# Patient Record
(demographics unavailable — no encounter records)

---

## 2017-04-11 NOTE — HEMODYNAMI
PATIENT:MARIA LUISA RICK                                     MEDICAL RECORD: [id number]
: 37                                            LOCATION:D.CAT          
ACCT# [id number]                                       ADMISSION DATE: 17
 
 
 Generatedon:201710:50
Patient name: MARIA LUISA RICK Patient #: [id number] Visit #: [id number] SSN: [id number]
 :
1937 Date of study: 2017
Page: Of
Hemodynamic Procedure Report
****************************
Patient Data
Patient Demographics
Procedure consent was obtained
First Name: MARIA LUISA           Gender: Female
Last Name: MERLINE           : 1937
Patient #: [id number]       Age: 80 year(s)
Visit #: [id number]       Race: 
SSN: 
Accession #:
[id number]
Additional ID: [id number]
Contact details
Address: [facility]              Phone: [phone]
[facility]
State: [place]
City: [place]
Zip code: [address]
Admission
Admission Data
Admission Date: 2017   Admission Time: 7:23
Arrival Date: 2017     Arrival Time: 10:00
Admit Source: Other         Insurance Payor: Medicare
Height (in.): 6             BSA: 0.3 (m2)
Height (cm.): 15.24         BMI: 2714.63 (kg/m2)
Weight (lbs.): 139
Weight (kg.): 63.05
Lab Results
Lab Result Date: 2017  Lab Result Time: 0:00
Biochemistry
Name         Units    Result                Min      Max
BUN          mg/dl    35       --(----)-*   7        18
Creatinine   mg/dl    1.5      --(----)-*   0.6      1.3
CBC
Name         Units    Result                Min      Max
Hemoglobin   g/dl     13.1     -*(----)--   13.5     17.5
Procedure
Procedure Types
Cath Procedure
Diagnostic Procedure
[facility] w/Coronaries w/Grafts
FFR/IVUS
Intra-Coronary IVUS Initial
Miscellaneous Procedures
Moderate Sedation up to 45 minutes
Procedure Description
Procedure Date
 
Procedure Date: 2017
Procedure Start Time: 10:26
Procedure End Time: 10:46
Procedure Staff
Name                            Function
Joselito Carlisle MD                Performing Physician
Delilah Lind RT               Scrub
Alexa Dan RN                  Nurse
Diamond Morales RT                    Monitor
Indication
Cardiomyopathy
Procedure Data
Cath Procedure
Fluoroscopy
Diagnostic fluoroscopy      Total fluoroscopy Time: 5.7
time: 5.7 min               min
Diagnostic fluoroscopy      Total fluoroscopy dose: 784
dose: 784 mGy               mGy
Contrast Material
Contrast Material Type                       Amount (ml)
Isovue 370                                   112
Entry Location
Entry     Primary  Successful  Side  Size  Upsize Upsize Entry    Closure Succes
sful  Closure
Location                             (Fr)  1 (Fr) 2 (Fr) Remarks  Device        
      Remarks
Femoral                        Right 5 Fr  6 Fr                   Exoseal
artery                                     Short
Estimated blood loss: 5 ml
Diagnostic catheters
Device Type               Used For           End Catheter
Placement
Cordis 5Fr Pigtail        LV Angiography
Catheter (MP)
Cordis 5Fr JL 4.0         Left Coronary
Catheter (MP)             Angiography
Cordis 5Fr 3DRC Catheter  Right Coronary
(MP)                      Angiography
Diagnostic Infinity 5Fr   Multi-vessel
AR 2 MOD catheter         Angiography
Procedure Complications
No complications
Procedure Medications
Medication           Administration Route Dosage
Oxygen               NC                   2 l/min
Lidocaine 2%         added to field       20
Heparin Flush Bag    added to field       2 bags
(1000units/500ml NS)
0.9% NaCl            I.V.                 100 ml/hr
Versed               I.V.                 1 mg
Fentanyl             I.V.                 50 mcg
Versed               I.V.                 1 mg
Fentanyl             I.V.                 50 mcg
Heparin Bolus        I.V.                 4000 units
Integrilin (Bolus    I.V.                 5.6 ml
2mg/ml)
Plavix               P.O.                 600 mg
Hemodynamics
Rest
BSA: 0.3 (m2) HGB: 13.1 (g/dl) O2 Consumption: Estimated: 24.49 (ml/min) O2 Cons
 
umption indexed:
Estimated:81.63 (ml/min/m) Heart Rate: 48 (bpm)
Pressure Samples
Time     Site     Value (mmHg) Purpose      Heart      Use
Rate(bpm)
10:29    LV       74/9,11      Snapshot     70
Snapshots
Pre Cath      Intra         NCS           Post Cath
Vital Signs
Time     Heart  Resp   SPO2 NIBP (mmHg) Rhythm  Pain    Sedation
Rate   (ipm)  (%)                      Status  Level
(bpm)
9:46:48  73     16     97   182/80(134) NSR     0 (11)  10(A)
, No
pain
9:51:21  69     16     100  174/77(132) NSR     0 (11)  10(A)
, No
pain
9:55:49  69     18     100  167/72(131) NSR     0 (11)  10(A)
, No
pain
10:00:19 69     18     100  169/72(125) NSR     0 (11)  10(A)
, No
pain
10:04:41 69     19     96   157/75(121) NSR     0 (11)  10(A)
, No
pain
10:09:10 69     20     98   155/62(124) NSR     0 (11)  10(A)
, No
pain
10:13:36 69     18     99   158/64(113) NSR     0 (11)  10(A)
, No
pain
10:17:56 69     16     97   146/64(114) NSR     0 (11)  9(A)
, No
pain
10:22:19 69     17     94   130/60(107) NSR     0 (11)  9(A)
, No
pain
10:27:26 84     16     97   132/55(94)  NSR     0 (11)  9(A)
, No
pain
10:31:48 69     16     95   132/59(95)  NSR     0 (11)  9(A)
, No
pain
10:36:06 69     16     95   123/52(96)  NSR     0 (11)  9(A)
, No
pain
10:40:18 69     17     94   110/45(82)  NSR     0 (11)  9(A)
, No
pain
10:44:38 69     16     98   109/44(77)  NSR     0 (11)  9(A)
, No
pain
10:48:56 69     16     94   107/44(83)  NSR     0 (11)  10(A)
, No
pain
Medications
Time     Medication       Route  Dose  Verified Delivered Reason          Notes 
   Effectiveness
 
by       by
9:48:32  Oxygen           NC     2     Joselito Liu    used for
l/min Orestes Dan RN   procedure
9:48:38  Lidocaine 2%     added  20ml  Joselito Yee   for local
to     vial  Orestes Carlisle MD  anesthetic
field
9:48:45  Heparin Flush    added  2     Joselito Yee   used for
Bag              to     bags  Orestes Carlisle MD  procedure
(1000units/500ml field
NS)
9:48:55  0.9% NaCl        I.V.   100   Joselitomariel Walkerie    Per physician
ml/hr Orestes Dan RN
10:11:39 Versed           I.V.   1 mg  Joselito Liu    for sedation
Orestes Dan RN
10:11:45 Fentanyl         I.V.   50    Joselito  Buffie    for sedation
mcg   Orestes Dan RN
10:27:54 Versed           I.V.   1 mg  Joselito Liu    for sedation
Orestes Dan RN
10:27:59 Fentanyl         I.V.   50    Joselito Liu    for sedation
mcg   Orestes Dan RN
10:35:18 Heparin Bolus    I.V.   4000  Joselito Liu    for             verifi
ed
units Orestes Dan RN   anticoagulation with dr carlisle
10:43:32 Integrilin       I.V.   5.6   Joselito Liu    for             Wasted
(Bolus 2mg/ml)          ml    Orestes Dan RN   antiplatelet    4.4 ml
therapy         of vial
10:49:09 Plavix           P.O.   600   Joselito Liu    for
mg    Orestes Dan RN   antiplatelet
therapy
Procedure Log
Time     Note
9:32:26  Patient Height : 15.24 cm
9:32:31  Patient Weight : 63.05 kg
9:32:32  Admit Source: Other
9:32:36  Arrival Date: 2017 10:00:00 AM
9:32:42  Insurance Payor : Medicare
9:33:36  Diagnostic Cath Status : Elective
9:35:47  Lab Result : Creatinine 1.5 mg/dl
9:35:47  Lab Result : BUN 35 mg/dl
9:35:47  Lab Result : Hemoglobin 13.1 g/dl
9:35:54  Diamond Morales RT(R) sent for patient. Start room use.
9:35:56  Time tracking: Regular hours
9:36:01  Plan of Care:Hemodynamics will remain stable., Cardiac
rhythm will remain stable., Comfort level will be
maintained., Respiratory function will remain
adequate., Patient/ family verbilizes understanding of
procedure., Procedure tolerated without complication.,
Recovers from procedure without complications..
9:36:08  Use device set Femoral Dx
9:36:11  Acist Syringe opened to sterile field.
9:36:11  Bag Decanter opened to sterile field.
9:36:11  Medline Cath Pack opened to sterile field.
9:36:12  Terumo 5Fr [place] Sheath opened to sterile field.
9:36:12  St Triston 260cm J .035 wire opened to sterile field.
9:36:14  Acist Hand Control opened to sterile field.
9:36:14  Acist Manifold opened to sterile field.
9:36:15  Diagnostic Infinity 5Fr Multipack catheter opened to
sterile field.
9:36:16  Tegaderm 4 x 4 opened to sterile field.
 
9:39:52  Patient received from Pre/Post Procedure Room to CCL 2
Alert and oriented. Tansferred to table in Supine
position.
9:39:54  Warm blankets applied, and rajeev hugger turned on for
patient comfort.
9:39:54  Correct patient and procedure confirmed by team.
9:39:56  Signed procedure consent form obtained from patient.
9:39:56  ECG and BP/O2 sat monitors applied to patient.
9:39:58  Full Disclosure recording started
9:43:44  H&P Date Dictated: 2017 Within 30 days and on
chart., H&P Addendum completed by physician on day of
procedure. (MUST COMPLETE FOR ALL OUTPATIENTS).
9:43:47  Pre-procedure instructions explained to patient.
9:43:48  Family in waiting room.
9:43:55  Is the patient allergic to Iodine/contrast media? No.
9:44:02  Is patient on blood thinner?No
9:44:07  Patient diabetic? Yes.
9:44:08  If diabetic: On Metformin? No
9:44:14  Snore? Yes
9:44:17  Sleep apnea? No
9:44:19  Sticks out tongue? Yes
9:44:25  Dentures? Yes in tight
9:44:30  Previous problem with sedation/anesthesia? No ?
9:44:40  Patient pain scale 0/10 ?.
9:44:51  IV patent on arrival in left forearm with 0.9% NaCl at
KVO.
9:44:59  Lab results completed and on chart.
9:45:23  Vital chart was started
9:45:54  Baseline sample Acquired.
9:46:04  Right groin area was prepped with chlora-prep and
draped in sterile fashion
9:46:08  Alarms reviewed by R. N.
9:46:09  Sharps counted by scrub and verified by R.N.
9:46:09  Physician paged
9:46:20  Baseline sample Acquired.
9:48:22  Baseline sample Acquired.
9:48:27  Rhythm: sinus rhythm
9:48:32  Oxygen 2 l/min NC was administered by Alexa Dan RN;
used for procedure;
9:48:34  Deviated septum? No
9:48:35  Opens mouth fully? Yes
9:48:37  Airway obstruction? No ?
9:48:38  Lidocaine 2% 20ml vial added to field was administered
by Joselito Carlisle MD; for local anesthetic;
9:48:41  Pre procedure: right dorsailis pedis pulse 1+
Palpable, but thready & weak; easily obliterated
9:48:45  Heparin Flush Bag (1000units/500ml NS) 2 bags added to
field was administered by Joselito Carlisle MD; used for
procedure;
9:48:55  0.9% NaCl 100 ml/hr I.V. was administered by Alexa Dan RN; Per physician;
9:55:53  Indication : Cardiomyopathy
10::46 Physician arrived
10::47 --------ALL STOP TIME OUT------
10::48 Final Timeout: patient, procedure, and site verified
with staff and physician. All members of the team are
in agreement.
10::50 Right groin site verified by team.
10::54 Physical assessment completed. ASA score P 2 - A
patient with mild systemic disease as per Joselito Carlisle MD.
10::58 Sedation plan: IV Moderate Sedation Versed, Fentanyl
10::39 Versed 1 mg I.V. was administered by Alexa Dan RN;
for sedation;
10::45 Fentanyl 50 mcg I.V. was administered by Alexa Dan
RN; for sedation;
10::26 Procedure started.
10::29 Local anesthetic to right femoral artery with
Lidocaine 2% by Joselito Carlisle MD.**INITIAL ACCESS
ONLY**
10:26:40 A 5 Fr sheath was inserted into the Right Femoral
artery
10::54 Versed 1 mg I.V. was administered by Alexa Dan RN;
for sedation;
10::59 Fentanyl 50 mcg I.V. was administered by Alexa Dan
RN; for sedation;
10:28:02 A Cordis 5Fr Pigtail Catheter (MP) was advanced over
the wire and used for LV Angiography.
10::06 Zero performed for pressure channel P1
10::36 LV hemodynamics recorded.
10::37 LV gram done using BRANDT
10::40 Injector settings: Ml/sec: 5, Volume: 15,
10::46 EF : 55 %
10::50 Catheter removed.
10::57 A Cordis 5Fr JL 4.0 Catheter (MP) was advanced over
the wire and used for Left Coronary Angiography.
10:30:40 LCA angiography performed.
10:30:44 Injector settings: Ml/sec: 3, Volume: 6,
10:31:19 Catheter removed.
10:31:26 A Cordis 5Fr 3DRC Catheter (MP) was advanced over the
wire and used for Right Coronary Angiography.
10:31:54 Volcano Platinum [place] IVUS Catheter opened to
sterile field.
10:31:54 [place] Whisper J 300cm 0.014 guide wire opened to
sterile field.
10:31:55 Merit BasixCompak Inflation Kit opened to sterile
field.
10:31:56 Terumo 6Fr [place] Sheath opened to sterile field.
10:32:15 RCA angiography performed.
10:32:18 Injector settings: Ml/sec: 3, Volume: 6,
10:32:35 LIMA to LAD angiography performed.
10:32:49 Catheter removed.
10:32:57 A Diagnostic Infinity 5Fr AR 2 MOD catheter was
advanced over the wire and used for Multi-vessel
Angiography.
10:33:17 SVG to Circ angiography performed.
10:33:38 SVG to RCA angiography performed.
10:33:54 Catheter removed.
10:33:56 Cordis 6FR XBLAD 3.5 guide catheter opened to sterile
field.
10:34:01 Proceeding to intervention.
10:34:08 Sheath upsized to a 6 Fr Short.
10:34:54 6 Fr xblad 3.5 guide catheter was inserted over the
wire
10:35:18 Heparin Bolus 4000 units I.V. was administered by
Alexa Dan RN; for anticoagulation; verified with dr carlisle
10:35:23 whisper wire advanced.
10:38:06 Wire removed.
10:38:19 Handpay Choice PT Extra Support J 300cm .014 gu
 
opened to sterile field.
10:38:57 choice pt wire advanced.
10:39:00 Wire advanced across lesion.
10:39:28 IVUS catheter advanced over wire.
10:42:27 IVUS pass to LAD lesion performed.
10:42:28 IVUS catheter removed over wire.
10:43:32 Integrilin (Bolus 2mg/ml) 5.6 ml I.V. was administered
by Alexa Dan RN; for antiplatelet therapy; Wasted
4.4 ml of vial
10:43:38 Inflation Number: 1 A Medtronic Integrity 3.0 X 22
stent was prepped and advanced across the Prox LAD.
The stent was deployed at 13 MOODY for 0:10 (min:sec).
10:43:56 Stent catheter was removed intact over wire.
10:43:57 Wire removed.
10:43:57 Guide catheter removed.
10:44:09 Cordis 6Fr Exoseal opened to sterile field.
10:44:18 Sheath removed intact; hemostasis achieved with
Exoseal to the Right Femoral artery.
10:44:20 Procedure ended.(Physican Out)
10:45:24 Fluoroscopy time 05.70 minutes.
10:45:28 Fluoroscopy dose: 784 mGy
10:45:28 Flurop Dose total: 784
10:45:32 Contrast amount:Isovue 370 112ml.
10:45:33 Sharps counted by scrub and verified by R.N.
10:45:36 Insertion/operative site no bleeding no hematoma.
10:45:38 Post-op/insertion site Right Femoral artery dressed
using a 4 x 4 and Tegaderm.
10:45:41 Post right femoral artery:stable
10:45:42 Post Procedure Pulses reassessed and unchanged
10:45:44 Post procedure rhythm: unchanged.
10:45:47 Estimated blood loss: 5 ml
10:45:49 Post procedure instruction explained to
patient.Patient verbalizes understanding.
10:45:49 Patient needs reinforcement of post procedure
teaching.
10:46:27 Procedure type changed to Cath procedure, Diagnostic
procedure, LHC, LHC w/Coronaries w/Grafts, FFR/IVUS,
Intra-Coronary IVUS Initial, Miscellaneous Procedures,
Moderate Sedation up to 45 minutes
10:46:30 Procedure and supply charges have been captured,
reviewed, submitted and are correct.
10:46:34 Procedure Complication : No complications
10:46:36 Vital chart was stopped
10:46:36 See physician's report for complete and final results.
10:46:39 Report given to Pre/Post Procedure Room.
10:46:42 Patient transfered to Pre/Post Procedure Room with
Stretcher.
10:46:44 Procedure ended.
10:46:44 Full Disclosure recording stopped
10:46:52 **ACC-PCI Only** Patient was given prescriptions, or
instructed by Joselito Carlisle MD to start/continue the
following medications upon discharge: Plavix
10:46:53 End room use (Document Last)
10:49:09 Plavix 600 mg P.O. was administered by Alexa Dan RN;
for antiplatelet therapy;
Intervention Summary
Intervention Notes
Time     ActionType  Lesion and  Equipment Action#  Pressure  Duration
Attributes  Used
10:43:38 Place stent Prox LAD    Medtronic 1        13        00:10
 
Integrity
3.0 X 22
stent
Device Usage
Item Name   Manufacture  Quantity  Catalog Number Hospital Part    Current Minim
al Lot# /
Charge   Number  Stock   Stock   Serial#
Code
Acist       Acist        1         [id number]          751946   839860  489980  20
Syringe     Medical
Systems Inc
Bag         Microtek     1         2002S          871685   51355   299143  5
Decanter    Medical Inc.
Medline     Cardinal     1         [id number]      160956   64781   994825  5
Cath Pack   Health
Terumo 5Fr  Terumo       1         [id number]         519593   728710  347152  40
Longview
Sheath
St Triston     St Triston      1         216867         286337   253492  257819  30
260cm J
.035 wire
Acist Hand  Acist        1         63626          859247   350203  408087  5
Control     Medical
Systems Inc
Acist       Acist        1         98318          179524   471709  986082  5
Manifold    Medical
Systems Inc
Diagnostic  Cardinal     1         [id number]         861258   18415   739059  30
Infinity    Health
5Fr
Multipack
catheter
Tegaderm 4  3M           1         1626W          966050   021701  861911  5
x 4
Cordis 5Fr  Cardinal     1                                         404751  5
Pigtail     Health
Catheter
(MP)
Cordis 5Fr  Cardinal     1                                         122971  5
JL 4.0      Health
Catheter
(MP)
Cordis 5Fr  Cardinal     1                                         026204  5
3DRC        Health
Catheter
(MP)
[place]     [place]      1         07572B         635008   349493  957379  8
[place]
Eagleye
IVUS
Catheter
Woods      Woods       1         [id number]      061158   486559  559408  5
Whisper J   Vascular
300cm 0.014
guide wire
Merit       Merit        1         [id number]         671547   643771  841014  15
[employer] Medical
Inflation
Kit
Terumo 6Fr  Terumo       1         [id number]         693747   439854  564495  40
 
Longview
Sheath
Diagnostic  Cardinal     1         [id number]        043087   006586  627294  20
Infinity    Health
5Fr AR 2
MOD
catheter
Cordis 6FR  Cardinal     1         [id number]       811943   285221  518574  10
XBLAD 3.5   Health
guide
catheter
Zapata Sci  Zapata       1         [id number]  102625   526780  753964  5
Choice PT   Scientific
Extra
Support J
300cm .014
gu
Medtronic   Medtronic    1         [id number]      463853   258407  856227  1    
   [id number]
Integrity
3.0 X 22
stent
Cordis 6Fr  Cardinal     1                   920457   868999  548194  10
[facility]     Health
Signature Audit Roslyn
Stage           Time        Signature      Unsigned
Intra-Procedure 2017   Diamond Morales
10:50:42 AM RT(R)
Signatures
Monitor : Diamond Morales RT   Signature :
______________________________
Date : ______________ Time :
______________
 
 
 
 
 
 
 
 
 
 
 
 
 
 
 
 
 
 
 
 
 
 
[address] [address]

## 2017-04-11 NOTE — OP
PATIENT NAME:  MARIA LUISA RICK                              MEDICAL RECORD: [id number]
:37                                             LOCATION:D.CAT          
                                                         ADMISSION DATE:        
SURGEON:  LANG SHEPARD MD             
 
 
DATE OF OPERATION:  2017
 
PROCEDURES:
1.  PTCA stent LAD.
2.  Intravascular ultrasound of the LAD.
3.  Left heart catheterization.
4.  Selective coronary angiography.
5.  Vein graft angiography.
6.  Left ventriculogram.
 
INDICATION:  Angina and coronary artery disease.
 
PROCEDURE:  After informed consent was obtained and after detailed explanation
of risks, benefits as well as alternative therapies, the patient elected to
proceed with angiogram and angioplasty.  The right femoral area was prepped and
draped in normal sterile fashion.  Right femoral artery was cannulated via
modified Seldinger technique with placement of 6-Kinyarwanda sheath.  All catheters
exchanged through this sheath.
 
FINDINGS:  The left ventriculogram was performed in standard 30-degree BRANDT view,
reveals good cardiac wall motion, ejection fraction 50% to 55%.
 
SELECTIVE CORONARY ANGIOGRAPHY:
1.  Left main showed no significant angiographic disease.
2.  Left anterior descending has a 75% stenosis confirmed by intravascular
ultrasound proximally, this is not grafted.
3.  Left circumflex has 100% stenosis in the mid vessel.
4.  Vein graft to the circumflex is widely patent.
5.  Right coronary has 100% stenosis in the mid vessel.
6.  Vein graft to the right coronary is widely patent.
 
PTCA STENT OF THE LAD:  The stent used is a 3.0 x 22 mm Integrity.  Result was
0% residual stenosis.
 
OVERALL IMPRESSION:  Successful percutaneous transluminal coronary angioplasty
stent of the left anterior descending going from 75% initial stenosis confirmed
by intravascular ultrasound to 0% residual stenosis.
 
TRANSINT:[id number] Voice Confirmation ID: [id number] DOCUMENT ID: [id number]
                                           
                                           LANG SHEPARD MD             
 
 
 
CC:                                                             [id number]
DICTATION DATE: 17 1050     :     17 1745      [facility] CLI 
                                                                      17
[place] [phone]

## 2017-04-11 NOTE — NUR
VOIDED 150CC OF CLEAR YELLOW URINE. HOB ELEVATED 30 DEGREES. RIGHT
GROIN CDI, NO BLEEDING OR HEMATOMA NOTED.

## 2017-04-11 NOTE — NUR
2L NC, NO RESP DISTRESS. VSS. NO C/O CHEST PAIN OR NAUSEA. RIGHT
GROIN 6F EXOSEAL CDI, NO BLEEDING OR HEMATOMA NOTED.  AT
BEDSIDE, CALL LIGHT WITHIN REACH.

## 2017-04-11 NOTE — NUR
PATIENT UP TO GET DRESSED WITH ASSIST. VERBAL AND WRITTEN DISCHARGE
GONE OVER WITH PATIENT AND FAMILY. LEFT VIA WC TO PARKING FOR FAMILY
TO TRANSPORT HOME CHEST PAIN DENIED R/GROIN CDI

## 2017-04-11 NOTE — NUR
QUIETLY RESTING. VSS. 2L NC, NO RESP DISTRESS NOTED. RIGHT GROIN CDI,
NO BLEEDING OR HEMATOMA. NO C/O CHEST PAIN OR NAUSEA.

## 2017-04-11 NOTE — NUR
WATER AND SANDWICH TRAY GIVEN. NO C/O NAUSEA. RIGHT GROIN CDI, NO
BLEEDING OR HEMATOMA NOTED. CALL LIGHT WITHIN REACH.

## 2017-04-11 NOTE — NUR
2L NC, NO RESP DISTRESS NOTED. RIGHT GROIN CDI, NO BLEEDING OR
HEMATOMA NOTED. VSS. NO C/O CHEST PAIN OR NAUSEA. WILL CONTINUE TO
MONITOR.

## 2017-04-11 NOTE — NUR
RESTING IN BED WITH EYES CLOSED. 2L NC, NO RESP DISTRESS NOTED. VSS.
RIGHT GROIN 6F EXOSEAL CDI, NO BLEEDING OR HEMATOMA NOTED. NO C/O
CHEST PAIN OR NAUSEA. INSTRUCTED PT TO KEEP HEAD FLAT ON PILLOW AND
RIGHT LEG STRAIGHT.

## 2017-04-11 NOTE — NUR
RIGHT GROIN 6F EXO SEAL CDI, NO BLEEDING OR HEMATOMA NOTED. 2L NC,
NO RESP DISTRESS NOTED. NO C/O CHEST PAIN OR NAUSEA. WILL CONTINUE TO
MONITOR.

## 2017-06-06 NOTE — NUR
0955 TELEMETRY APPLIED TO PATIENT.  JESSICA DINH [facility]
TELEMETRY SHOWS SINUS RHYTHM, RATE 69.  A. HUONG RICKETTS

## 2017-06-10 NOTE — OP
PATIENT NAME:  MARIA LUISA RICK                              MEDICAL RECORD: [id number]
:37                                             LOCATION:D.OPS          
                                                         ADMISSION DATE:        
SURGEON:  AISLINN SAEED MD             
 
 
DATE OF OPERATION:  2017
 
SURGEON:  Aislinn Saeed MD
 
ANESTHESIA:  General, Dr. Shepherd.
 
OPERATION PERFORMED:  AICD pulse generator exchange.
 
PREOPERATIVE DIAGNOSIS:  Cardiomyopathy.
 
POSTOPERATIVE DIAGNOSIS:  Cardiomyopathy.
 
INDICATION FOR OPERATION:  Pulse generator end of life.
 
FINDINGS OF THE OPERATION:  The explanted generator Medtronic model number
[id number], serial number [id number].
 
Explanted lead Medtronic model number [id number], serial number [id number].
 
ESTIMATED BLOOD LOSS:  Less than 5 cc.
 
DESCRIPTION OF PROCEDURE:  After informed consent, adequate preoperative
medication evaluation, the patient was brought to the operating room, placed on
the table in the supine position.  After induction of general anesthesia and
application of appropriate monitoring devices, the left chest prepped and draped
in a sterile field, utilizing Betadine scrub, alcohol, and Betadine solution.  A
Betadine-impregnated drape was also used, 1% lidocaine was infiltrated over the
device as well as in the pocket incision made and dissection carried down the
pocket.  The pocket was opened and expanded.  The device was removed.  The
pocket was enlarged medially and inferiorly to accept the new device. 
Hemostasis was assured.  The leads were then exchanged to the new device and the
explanted device removed from the field.  The device underwent analysis with the
right atrial lead, P-wave 2.8, impedance 532 ohms, threshold 0.7 volts. 
Ventricular lead, R-wave 18.2, resistance 684 ohms, HVB impedance 46, HZX
impedance 62, pacing threshold 0.5 volts.  Left ventricular lead impedance 1142
ohms, pacing threshold 1.5.
 
Hemostasis was assured.  The wound was irrigated with copious amounts of
antibiotic solution and normal saline.  The device and leads were placed back in
the pocket and the instrument count and sponge count were correct times 2. 
Pocket was closed in layers utilizing 3-0 Vicryl on deep subcutaneous tissue,
5-0 subcuticular Monocryl on the skin, sterile dressings were applied.  The
patient tolerated the procedure well and was transferred to postanesthesia
recovery in satisfactory condition with the device dectection and therapy turned
on.
 
TRANSINT:[id number] Voice Confirmation ID: [id number] DOCUMENT ID: [id number]
 
 
 
OPERATIVE REPORT                               [id number]    MARIA LUISA RICK EDWARD MD             
 
 
 
Electronically Signed by AISLINN SAEED on 06/10/17 at 1332
 
 
 
 
 
 
 
 
 
 
 
 
 
 
 
 
 
 
 
 
 
 
 
 
 
 
 
 
 
 
 
 
 
 
 
 
 
 
 
 
 
CC:                                                             [id number]
DICTATION DATE: 17 1257     :     17 2234      [facility] 
                                                                      17
[place] [id number]

## 2017-06-10 NOTE — HP
PATIENT: MARIA LUISA RICK                                    MEDICAL RECORD: [id number]
ACCOUNT: [id number]                                    LOCATION:D.OPS         
: 37                                            ADMISSION DATE: 17
                                                         
 
                             HISTORY AND PHYSICAL EXAMINATION
 
 
MARIA LUISA Carrillo (81yo, F) ID# [id number]. Date/Time2017
01:[id number] McKinley [facility] Services Dept.NP_[facility]
ClinicProviderAISLINN HUTTON MDInsuranceMed Primary: [employer] (MEDICARE
REPLACEMENT/ADVANTAGE - HMO)
Insurance # : [id number]
Referring Provider Name : CHAPIN BOWER
Employer Name : RETIRED
Prescription: CMX - Member is eligible. 
Prescription: [facility] MEDICAID ADMINISTRATION - Member is eligible. Chief Complaint
pulse generator end-of-life
Patient's Care Team
Referring Provider ():  CHAPIN BOWER: [address] [place] [address], Ph [phone], Fax [phone] 
Referring Provider:  LANG SHEPARD MD: [address] [place] [address], Ph [phone], Fax [phone] NPI: [id number]
Patient's Pharmacies
[id number] ([place]): [address] [facility] [address], Ph [phone], Fax
[phone]
Vitals
BP:140/80 sitting R arm 2017 01:09 pm
130/70 sitting L arm 2017 01:09 pmHR:88irreg 2017 01:10 pmHt:5 ft
2017 01:09 pmWt:139 lbs 2017 01:06 pmBMI:27.1 2017 01:09
pmAllergies
Reviewed Allergies
LISINOPRILMETRONIDAZOLEPENICILLINSSULFA (SULFONAMIDE ANTIBIOTICS)Medications
Reviewed Medications
Accu-Chek Softclix Lancing Device+Lancets kit17   filledCaremarkAlcohol Prep
Pads17   filledCaremarkatorvastatin 80 mg frcuwo01/02/17  
filledCaremarkciprofloxacin 250 mg iwzkhz10/15/17   filledCaremarkclopidogrel 75 mg
iuaxnu45/03/17   filledCaremarkComfort EZ Syringe 0.5 mL 31 gauge x 5/16"17  
filledCaremarkDebrox 6.5 % ear drops
PLACE 5 DROPS IN BOTH EARS BID FOR 5 DAYS10/19/16   filledsurescriptsEmbrace Lancets
30 gauge17   filledCaremarkisosorbide mononitrate ER 30 mg tablet,extended
release 24 hr17   filledCaremarkLantus 100 unit/mL subcutaneous
ywoygxqo04/03/17   filledCaremarklisinopril 20 mg-hydrochlorothiazide 12.5 mg
xyprfu80/13/17   filledCaremarkmetoprolol succinate ER 50 mg tablet,extended release
24 hr17   filledCaremarknitrofurantoin monohydrate/macrocrystals 100 mg
vwvdvym37/16/17   filledCaremarkNovoLOG 100 unit/mL subcutaneous clnnadww90/03/17  
filledCaremarkOneTouch Verio tatcwv83/24/17   filledCaremarkTRUEplus Insulin 0.3 mL
31 gauge x 5/16" ubxzcyc34/03/17   filledCaremarkProblems
Reviewed Problems
 
Sick sinus syndrome - Onset: 2017
Automatic implantable cardiac defibrillator in situ - Onset: 2017
Family History
Discussed Family History
 
Father- Heart diseaseSocial History
Discussed Social History
Cardiology
 
 
 
HISTORY AND PHYSICAL                           [id number]    MARIA LUISA RICK            
 
 
Family history of heart disease?: Y
Smoking Status: Never smoker
High Cholesterol: Y
High blood pressure: Y
Diabetes: Y
Alcohol intake: None
Surgical History
Reviewed Surgical History
 
Other - defibrillator medtronic
Other - PTCA/stent
CABG
GYN History
(not configured)
Past Medical History
Discussed Past Medical History
Cancer: Y
Coronary Artery Disease: Y
Heart Disease: Y
High Blood Pressure: Y
Hyperlipidemia: Y
Irregular heart beat: Y
Shortness of Breath: Y
Notes: Cardiomyopathy
Documents for Discussion
N/A
Screening
None recorded.
HPI
Dysrhythmia
Reported by patient.
Symptoms: AICD generator exchange 
Frequency: seldom 
Limitations: mild
automatic implantable cardio defibrillator at end of life
ROS
Patient reports exercise intolerance  but reports no fever, no night sweats, no
significant weight gain, and no significant weight loss. She reports shortness of
breath when walking  but reports no chest pain, no arm pain on exertion, no
shortness of breath when lying down, no palpitations, and no known heart murmur. She
reports shortness of breath but reports no cough, no wheezing, and no coughing up
blood. She reports muscle aches, arthralgias/joint pain, and swelling in the
extremities but reports no muscle weakness and no back pain. Sh e reports no dry
eyes, no irritation, and no vision change. She reports no difficulty hearing and no
ear pain. She reports no frequent nosebleeds and no nose/sinus problems. She reports
no sore throat, no bleeding gums, no snoring, no dry mouth, no mouth u lcers, no
oral abnormalities, and no teeth problems. She reports no jugular vein distension
and no swollen glands. She reports no abdominal pain, no vomiting, normal appetite,
no diarrhea, not vomiting blood, no nausea, and no constipation. She reports no 
incontinence, no difficulty urinating, no hematuria, and no increased frequency. She
reports no abnormal mole, no jaundice, and no rashes. She reports no loss of
consciousness, no weakness, no numbness, no seizures, no dizziness, and no
headaches. She rep o rts no depression, no sleep disturbances, feeling safe in
relationship, and no alcohol abuse. She reports no fatigue. She reports no swollen
glands and no bruising. She reports no runny nose, no sinus pressure, no itching, no
 
 
 
HISTORY AND PHYSICAL                           [id number]    WALKER,MARIA LUISA            
 
 
hives, and no frequent sneezi ng. 
ROS as noted in the HPI
Physical Exam
Patient is an 80-year-old female.
 
Constitutional: General Appearance healthy-appearing, well developed, and
overweight. Level of Distress chronically ill. Ambulation ambulating normally.
 
Cardiovascular: Apical Impulse not displaced or no thrill. Heart Auscultation normal
s1 and s2; no murmurs, rubs, or gallops; and RRR; normal sinus rhythm. Neck Vessels
automatic implantable cardio defibrillator in situ. Arterial Pulses no abdominal
aorta bruits, femoral bruits, o r popliteal bruits and 2+ bilateral, carotid 2+
bilateral, femoral 2+ bilateral, popliteal 2+ bilateral, and dorsalis pedis 2+
bilateral. Edema no edema or varicosities.
 
Lungs: Repiratory Effort no dyspnea. Percussion no hyperresonance or dullness or
flatness. Auscultation no wheezing, rhonchi, or rales / crackles and breathing
sounds normal, good air movement, and CTA except as noted.
 
Abdomen: Bowl Sounds normal. Inspection and Palpation no tenderness, guarding, rob
s, or rebound tenderness and soft and non-distended. Liver non-tender and no
hepatomegaly. Spleen non-tender and no splenomegaly. Hernia none palpable.
 
Musculoskeletal System: Gait And Stance normal gait and stance. Digits and Nails
normal nails and no cyanosis. Joints, Bones, and Muscles limited ROM and abnormal
strength.
 
Neurologic: Cranial Nerves grossly intact. Reflexes DTRs 2+ bilaterally throughout.
Sensation grossly intact.
 
Lymph Nodes: Lymph Nodes no cervical LAD, supraclavicular LAD, axillary LAD, or
inguinal LAD.
 
Eyes: Lids and Conjunctivae no discharge or pallor and non-injected. Pupils PERRLA.
Cornea grossly intact. EOM EOMI. Lens clear. Sclera non-icteric.
 
Neck: Neck no masses, enlarged lymph nodes, or carotid bruits and supple and trachea
midline. Thyroid no enlargement or nodules and non-tender.
 
Skin: Inspection and Palpation no rash, lesions, ulcers, jaundice, or abnormal nevi.
Assessment / Plan
sick sinus syndrome
Cardiomyopathy
Pulse generator end-of-life
 
1. Sick sinus syndrome
I49.5: Sick sinus syndrome
 
2. Automatic implantable cardiac defibrillator in situ
Z95.810: Presence of automatic (implantable) cardiac defibrillator
 
Discussion Notes
I have discussed her disease process with her and her family in detail as well as
the alternative methods of treatment. We discussed automatic implantable cardio
defibrillator exchange including the expected benefits and risks which include
 
 
 
HISTORY AND PHYSICAL                           [id number]    WALKER,MARIA LUISA            
 
 
bleeding, infection, stroke, loss o f limb, and death, and the imponderables. There
is also a chance lead revision
She and her family understand all of the above and wish to proceed with planned
procedure
 
 
                                           
                                           AISLINN HUTTON MD             
 
 
 
Electronically Signed by AISLINN HUTTON on 06/10/17 at 1332
 
 
 
 
 
 
 
 
 
 
 
 
 
 
 
 
 
 
 
 
 
 
 
 
 
 
 
 
 
 
 
 
 
 
 
CC:                                                             [id number]
DICTATION DATE: 17 1319     : DM  17 1719      [facility] 
                                                                      17
[place] [id number] [place] [address]